# Patient Record
Sex: MALE | Race: BLACK OR AFRICAN AMERICAN | Employment: FULL TIME | URBAN - METROPOLITAN AREA
[De-identification: names, ages, dates, MRNs, and addresses within clinical notes are randomized per-mention and may not be internally consistent; named-entity substitution may affect disease eponyms.]

---

## 2017-06-13 ENCOUNTER — HOSPITAL ENCOUNTER (OUTPATIENT)
Dept: LAB | Age: 22
Discharge: HOME | End: 2017-06-13

## 2017-06-13 ENCOUNTER — OFFICE VISIT (OUTPATIENT)
Dept: FAMILY MEDICINE CLINIC | Age: 22
End: 2017-06-13

## 2017-06-13 VITALS
OXYGEN SATURATION: 99 % | RESPIRATION RATE: 16 BRPM | HEIGHT: 67 IN | HEART RATE: 77 BPM | SYSTOLIC BLOOD PRESSURE: 110 MMHG | BODY MASS INDEX: 25.27 KG/M2 | WEIGHT: 161 LBS | TEMPERATURE: 98.3 F | DIASTOLIC BLOOD PRESSURE: 70 MMHG

## 2017-06-13 DIAGNOSIS — N34.2 URETHRITIS: Primary | ICD-10-CM

## 2017-06-13 DIAGNOSIS — N34.2 URETHRITIS: ICD-10-CM

## 2017-06-13 DIAGNOSIS — L70.8 ACNEIFORM RASH: ICD-10-CM

## 2017-06-13 DIAGNOSIS — Z11.3 SCREEN FOR STD (SEXUALLY TRANSMITTED DISEASE): ICD-10-CM

## 2017-06-13 LAB — SENTARA SPECIMEN COL,SENBCF: NORMAL

## 2017-06-13 PROCEDURE — 99001 SPECIMEN HANDLING PT-LAB: CPT | Performed by: FAMILY MEDICINE

## 2017-06-13 RX ORDER — DOXYCYCLINE 100 MG/1
100 TABLET ORAL 2 TIMES DAILY
Qty: 20 TAB | Refills: 0 | Status: SHIPPED | OUTPATIENT
Start: 2017-06-13 | End: 2017-06-29 | Stop reason: SDUPTHER

## 2017-06-13 RX ORDER — AZITHROMYCIN 500 MG/1
1000 TABLET, FILM COATED ORAL DAILY
Qty: 2 TAB | Refills: 0 | Status: SHIPPED | OUTPATIENT
Start: 2017-06-13 | End: 2017-06-14

## 2017-06-13 NOTE — PATIENT INSTRUCTIONS
Safer Sex: Care Instructions  Your Care Instructions  Safer sex is a way to reduce your risk of getting an infection spread through sex. It can also help prevent pregnancy. Most infections that are spread through sex, also called sexually transmitted infections or STIs, can be cured. But some can decrease your chances of getting pregnant if they are not treated early. Others, such as herpes, have no cure. And some, such as HIV, can be deadly. Several products can help you practice safer sex and reduce your chance of STIs. One of the best is a condom. There are condoms for men and for women. The female condom is a tube of soft plastic with a closed end that is placed deep into the vagina. You can use a special rubber sheet (dental dam) for protection during oral sex. Latex gloves can keep your hands from touching blood, semen, or other body fluids that can carry infections. Remember that birth control methods such as diaphragms, IUDs, foams, and birth control pills do not stop you from getting STIs. Follow-up care is a key part of your treatment and safety. Be sure to make and go to all appointments, and call your doctor if you are having problems. Its also a good idea to know your test results and keep a list of the medicines you take. How can you care for yourself at home? · Think about getting shots to prevent hepatitis A and hepatitis B. These two diseases can be spread through sex. You also can get hepatitis A if you eat infected food. · Use condoms or female condoms each time and every time you have sex. · Learn the right way to use a male condom:  ¨ Condoms come in several sizes. Make sure you use the right size. A condom that is too small can break easily. A condom that is too big can slip off during sex. Use a new condom each time you have sex. ¨ Be careful not to poke a hole in the condom when you open the wrapper. ¨ Squeeze the tip of the condom to keep out air.   ¨ Pull down the loose skin (foreskin) from the head of an uncircumcised penis. ¨ While squeezing the tip of the condom, unroll it all the way down to the base of the firm penis. ¨ Never use petroleum jelly (such as Vaseline), grease, hand lotion, baby oil, or anything with oil in it. These products can make holes in the condom. ¨ After sex, hold the condom on your penis as you remove your penis from your partner. This will keep semen from spilling out of the condom. · Learn to use a female condom:  ¨ You can put in a female condom up to 8 hours before sex. ¨ Squeeze the smaller ring at the closed end and insert it deep into the vagina. The larger ring at the open end should stay outside the vagina. ¨ During sex, make sure the penis goes into the condom. ¨ After the penis is removed, close the open end of the condom by twisting it. Remove the condom. · Do not use a female condom and male condom at the same time. · Do not have sex with anyone who has symptoms of an STI, such as sores on the genitals or mouth. The herpes virus that causes cold sores can spread to and from the penis and vagina. · Do not drink a lot of alcohol or use drugs before sex. This can cause you to let down your guard and not practice safer sex. · Having one sex partner (who does not have STIs and does not have sex with anyone else) is a sure way to avoid STIs. · Talk to your partner before you have sex. Find out if he or she has or is at risk for any STI. Keep in mind that a person may be able to spread an STI even if he or she does not have symptoms. You and your partner may want to get an HIV test. You should get tested again 6 months later. Where can you learn more? Go to http://pro-joe.info/. Enter O472 in the search box to learn more about \"Safer Sex: Care Instructions. \"  Current as of: May 27, 2016  Content Version: 11.2  © 6135-2802 [employer].  Care instructions adapted under license by [employer] (which disclaims liability or warranty for this information). If you have questions about a medical condition or this instruction, always ask your healthcare professional. [address] any warranty or liability for your use of this information.   Follow up in 1 month for complete physical

## 2017-06-13 NOTE — PROGRESS NOTES
SUBJECTIVE  Chief Complaint   Patient presents with    Other     would like STD checking (having some mild itching in groin area)     Patient presents for STD testing. He has some concern about possible discharge. He says it has been intermittent for several weeks. Has had a new partner for 2 months about. Says that he has no other symptoms. Patient presents complaining of a rash. The patients reports the rash began about 1 month ago. The patients reports the rash is sometimes pruritic. The patient has tried a topical acne cream with very gradual improvement. The patient denies any new exposures, including, soaps, detergents, foods, etc.       OBJECTIVE    Blood pressure 110/70, pulse 77, temperature 98.3 °F (36.8 °C), temperature source Oral, resp. rate 16, height 5' 7\" (1.702 m), weight 161 lb (73 kg), SpO2 99 %. General:  Alert, cooperative, well appearing, in no apparent distress. : declines  Skin:  Forehead with a papulopustular rash spread throughout. There is a base of hyperpigmentation. ASSESSMENT / PLAN    ICD-10-CM ICD-9-CM    1. Urethritis N34.2 597.80 CHLAMYDIA / Marlise Creeks   2. Acneiform rash L30.9 692.9    3. Screen for STD (sexually transmitted disease) Z11.3 V74.5      Urethritis - will test for chlamydia / gonorrhea. I have advised on testing for HIV/syphilis but he declines. He is to call for results. If this is chlamydia, it will clear with the doxy prescribed for his rash. Acneiform rash - will see how he responds to doxycycline 100mg po bid for 10 days. He can continue his topical ance cream.  He is advised to avoid wearing anything in the area. 15 minutes of face to face time spent with the patient with at least 50% on counseling on above medical issues. All chart history elements were reviewed by me at the time of the visit even though marked at time of note closure. Patient understands our medical plan.  Patient has provided input and agrees with goals. Alternatives have been explained and offered. All questions answered. The patient is to call if condition worsens or fails to improve.      Follow-up Disposition:  Return in about 1 month (around 7/13/2017) for complete physical.

## 2017-06-13 NOTE — PROGRESS NOTES
1. Have you been to the ER, urgent care clinic since your last visit? Hospitalized since your last visit? No    2. Have you seen or consulted any other health care providers outside of the [address] [place] [address] since your last visit? Include any pap smears or colon screening.  No

## 2017-06-13 NOTE — MR AVS SNAPSHOT
Visit Information Date & Time Provider Department Dept. Phone Encounter #  
 6/13/2017  3:30 PM Celeste Aguirre, 0 [facility] [phone] [id number] Follow-up Instructions Return in about 1 month (around 7/13/2017) for complete physical.  
  
Upcoming Health Maintenance Date Due  
 HPV AGE 9Y-34Y (1 of 3 - Male 3 Dose Series) 11/5/2006 DTaP/Tdap/Td series (7 - Td) 5/4/2017 INFLUENZA AGE 9 TO ADULT 8/1/2017 Allergies as of 6/13/2017  Review Complete On: 6/23/2016 By: Celeste Aguirre MD  
 No Known Allergies Current Immunizations  Reviewed on 1/8/2015 Name Date DTAP Vaccine 12/13/1999, 11/7/1996, 4/26/1996, 3/4/1996, 1/5/1996 HIB Vaccine 11/7/1996, 4/26/1996, 3/4/1996, 1/5/1996 Hepatitis A Vaccine 11/13/2007, 5/4/2007 Hepatitis B Vaccine 4/26/1996, 1/5/1996, 1995 IPV 12/13/1999, 4/26/1996, 3/4/1996, 1/5/1996 MMR Vaccine 12/13/1999, 11/7/1997 Meningococcal (MCV4P) Vaccine 8/5/2013 Meningococcal Vaccine 5/4/2007 PPD 12/13/1999, 3/30/1998 TDAP Vaccine 5/4/2007 Varicella Virus Vaccine Live 11/3/2010, 5/13/1997 Not reviewed this visit You Were Diagnosed With   
  
 Codes Comments Urethritis    -  Primary ICD-10-CM: N34.2 ICD-9-CM: 597.80 Acneiform rash     ICD-10-CM: L30.9 ICD-9-CM: 692.9 Screen for STD (sexually transmitted disease)     ICD-10-CM: Z11.3 ICD-9-CM: V74.5 Vitals BP Pulse Temp Resp Height(growth percentile) Weight(growth percentile) 110/70 (BP 1 Location: Left arm, BP Patient Position: Sitting) 77 98.3 °F (36.8 °C) (Oral) 16 5' 7\" (1.702 m) 161 lb (73 kg) SpO2 BMI Smoking Status 99% 25.22 kg/m2 Never Smoker Vitals History BMI and BSA Data Body Mass Index Body Surface Area  
 25.22 kg/m 2 1.86 m 2 Preferred Pharmacy Pharmacy Name Phone [facility] [address] [phone] Your Updated Medication List  
  
   
This list is accurate as of: 6/13/17  4:05 PM.  Always use your most recent med list.  
  
  
  
  
 azithromycin 500 mg Tab Commonly known as:  Himarina Palma Take 2 Tabs by mouth daily for 1 day. doxycycline 100 mg tablet Commonly known as:  ADOXA Take 1 Tab by mouth two (2) times a day. fluticasone 50 mcg/actuation nasal spray Commonly known as:  Mena Rosanne 2 Sprays by Both Nostrils route daily. loratadine 10 mg tablet Commonly known as:  Jarrett Adan Take 1 Tab by mouth daily. triamcinolone acetonide 0.1 % topical cream  
Commonly known as:  KENALOG Apply  to affected area two (2) times a day. use thin layer Prescriptions Sent to Pharmacy Refills  
 azithromycin (ZITHROMAX) 500 mg tab 0 Sig: Take 2 Tabs by mouth daily for 1 day. Class: Normal  
 Pharmacy: [facility] [address] [place] [address] Ph #: [phone] Route: Oral  
 doxycycline (ADOXA) 100 mg tablet 0 Sig: Take 1 Tab by mouth two (2) times a day. Class: Normal  
 Pharmacy: [facility] [address] [place] [address] Ph #: [phone] Route: Oral  
  
Follow-up Instructions Return in about 1 month (around 7/13/2017) for complete physical.  
  
To-Do List   
 06/13/2017 Lab:  David Monique / Sade Monroy Patient Instructions Safer Sex: Care Instructions Your Care Instructions Safer sex is a way to reduce your risk of getting an infection spread through sex. It can also help prevent pregnancy. Most infections that are spread through sex, also called sexually transmitted infections or STIs, can be cured. But some can decrease your chances of getting pregnant if they are not treated early. Others, such as herpes, have no cure. And some, such as HIV, can be deadly.  
Several products can help you practice safer sex and reduce your chance of STIs. One of the best is a condom. There are condoms for men and for women. The female condom is a tube of soft plastic with a closed end that is placed deep into the vagina. You can use a special rubber sheet (dental dam) for protection during oral sex. Latex gloves can keep your hands from touching blood, semen, or other body fluids that can carry infections. Remember that birth control methods such as diaphragms, IUDs, foams, and birth control pills do not stop you from getting STIs. Follow-up care is a key part of your treatment and safety. Be sure to make and go to all appointments, and call your doctor if you are having problems. Its also a good idea to know your test results and keep a list of the medicines you take. How can you care for yourself at home? · Think about getting shots to prevent hepatitis A and hepatitis B. These two diseases can be spread through sex. You also can get hepatitis A if you eat infected food. · Use condoms or female condoms each time and every time you have sex. · Learn the right way to use a male condom: ¨ Condoms come in several sizes. Make sure you use the right size. A condom that is too small can break easily. A condom that is too big can slip off during sex. Use a new condom each time you have sex. ¨ Be careful not to poke a hole in the condom when you open the wrapper. ¨ Squeeze the tip of the condom to keep out air. ¨ Pull down the loose skin (foreskin) from the head of an uncircumcised penis. ¨ While squeezing the tip of the condom, unroll it all the way down to the base of the firm penis. ¨ Never use petroleum jelly (such as Vaseline), grease, hand lotion, baby oil, or anything with oil in it. These products can make holes in the condom. ¨ After sex, hold the condom on your penis as you remove your penis from your partner. This will keep semen from spilling out of the condom. · Learn to use a female condom: ¨ You can put in a female condom up to 8 hours before sex. ¨ Squeeze the smaller ring at the closed end and insert it deep into the vagina. The larger ring at the open end should stay outside the vagina. ¨ During sex, make sure the penis goes into the condom. ¨ After the penis is removed, close the open end of the condom by twisting it. Remove the condom. · Do not use a female condom and male condom at the same time. · Do not have sex with anyone who has symptoms of an STI, such as sores on the genitals or mouth. The herpes virus that causes cold sores can spread to and from the penis and vagina. · Do not drink a lot of alcohol or use drugs before sex. This can cause you to let down your guard and not practice safer sex. · Having one sex partner (who does not have STIs and does not have sex with anyone else) is a sure way to avoid STIs. · Talk to your partner before you have sex. Find out if he or she has or is at risk for any STI. Keep in mind that a person may be able to spread an STI even if he or she does not have symptoms. You and your partner may want to get an HIV test. You should get tested again 6 months later. Where can you learn more? Go to http://pro-joe.info/. Enter E522 in the search box to learn more about \"Safer Sex: Care Instructions. \" Current as of: May 27, 2016 Content Version: 11.2 © 4703-9233 [email or web address]. Care instructions adapted under license by [employer] (which disclaims liability or warranty for this information). If you have questions about a medical condition or this instruction, always ask your healthcare professional. [address] any warranty or liability for your use of this information. Follow up in 1 month for complete physical 
 
  
Introducing hospitals & HEALTH SERVICES!    
 [employer] introduces [employer] patient portal. Now you can access parts of your medical record, email your doctor's office, and request medication refills online. 1. In your internet browser, go to [email or web address] 2. Click on the First Time User? Click Here link in the Sign In box. You will see the New Member Sign Up page. 3. Enter your [employer] Access Code exactly as it appears below. You will not need to use this code after youve completed the sign-up process. If you do not sign up before the expiration date, you must request a new code. · Cell[employer] Access Code: [facility] Expires: 9/11/2017  4:05 PM 
 
4. Enter the last four digits of your Social Security Number (xxxx) and Date of Birth (mm/dd/yyyy) as indicated and click Submit. You will be taken to the next sign-up page. 5. Create a [employer] ID. This will be your [employer] login ID and cannot be changed, so think of one that is secure and easy to remember. 6. Create a [employer] password. You can change your password at any time. 7. Enter your Password Reset Question and Answer. This can be used at a later time if you forget your password. 8. Enter your e-mail address. You will receive e-mail notification when new information is available in [address] 9. Click Sign Up. You can now view and download portions of your medical record. 10. Click the Download Summary menu link to download a portable copy of your medical information. If you have questions, please visit the Frequently Asked Questions section of the [employer] website. Remember, [employer] is NOT to be used for urgent needs. For medical emergencies, dial 911. Now available from your iPhone and Android! Please provide this summary of care documentation to your next provider. Your primary care clinician is listed as Robin Plasencia. If you have any questions after today's visit, please call [phone].

## 2017-06-14 LAB
CHLAMYDIA AMPLIFIED URINE: NEGATIVE
GC AMPLIFIED URINE,919: NEGATIVE

## 2017-06-15 NOTE — PROGRESS NOTES
Nurse to advise that the testing was negative. I would still continue the medication (antibiotic) we prescribed to help his forehead rash.

## 2017-06-15 NOTE — PROGRESS NOTES
Mobile place call to [address] [place] [address] and last 4 digits of SS# verified. Per Dr. Jeff Copjohnathan labs were normal, but to continue with antibiotic until all gone.  Melvin Navas voice complete understanding

## 2017-06-23 NOTE — TELEPHONE ENCOUNTER
Patient states he still has a rash on his forehead. This patient contacted office for the following prescriptions to be filled:    Medication requested :   Requested Prescriptions     Pending Prescriptions Disp Refills    doxycycline (ADOXA) 100 mg tablet 20 Tab 0     Sig: Take 1 Tab by mouth two (2) times a day.      PCP: Miguelito Collier or Print: [address]  Mail order or Local pharmacy: [phone]    Scheduled appointment if not seen by current providers in office: LOV: 6/13/17, No followup

## 2017-06-26 RX ORDER — DOXYCYCLINE 100 MG/1
100 TABLET ORAL 2 TIMES DAILY
Qty: 20 TAB | Refills: 0 | OUTPATIENT
Start: 2017-06-26

## 2017-06-29 ENCOUNTER — OFFICE VISIT (OUTPATIENT)
Dept: FAMILY MEDICINE CLINIC | Age: 22
End: 2017-06-29

## 2017-06-29 VITALS
RESPIRATION RATE: 16 BRPM | BODY MASS INDEX: 25.27 KG/M2 | WEIGHT: 161 LBS | HEIGHT: 67 IN | DIASTOLIC BLOOD PRESSURE: 70 MMHG | SYSTOLIC BLOOD PRESSURE: 114 MMHG | HEART RATE: 71 BPM | OXYGEN SATURATION: 98 % | TEMPERATURE: 98.3 F

## 2017-06-29 DIAGNOSIS — L70.9 ACNE, UNSPECIFIED ACNE TYPE: Primary | ICD-10-CM

## 2017-06-29 RX ORDER — DOXYCYCLINE 100 MG/1
100 TABLET ORAL DAILY
Qty: 30 TAB | Refills: 0 | Status: SHIPPED | OUTPATIENT
Start: 2017-06-29 | End: 2019-12-23

## 2017-06-29 NOTE — PROGRESS NOTES
1. Have you been to the ER, urgent care clinic since your last visit? Hospitalized since your last visit? No    2. Have you seen or consulted any other health care providers outside of the [address] [place] [address] since your last visit? Include any pap smears or colon screening.  No

## 2017-06-29 NOTE — PROGRESS NOTES
SUBJECTIVE  Chief Complaint   Patient presents with    Medication Evaluation    Rash     Patient presents complaining of a persistent acneiform rash. He says he has been using OTC differin cream with some relief for a few weeks. He says that he noticed more relief when he was on antibiotics. He no longer complains of any  symptoms. OBJECTIVE    Blood pressure 114/70, pulse 71, temperature 98.3 °F (36.8 °C), temperature source Oral, resp. rate 16, height 5' 7\" (1.702 m), weight 161 lb (73 kg), SpO2 98 %. General:  alert, cooperative, well appearing, in no apparent distress. Skin:  He has hyperpigmentation on his forehead. He has an acneiform rash on his forehead with some scattered whiteheads. Some are papular. ASSESSMENT / PLAN    ICD-10-CM ICD-9-CM    1. Acne, unspecified acne type L70.9 706.1      We will start him on doxycycline 100mg daily. He will be careful with sun exposure. He will continue use of differin at night. He will start a daily benzoyl peroxide wash in the mornings. I advised that this can be drying. I advised that the hyperpigmentation is a typical post-inflammatory change that will take time to resolve. 10 minutes of face to face time spent with the patient with at least 50% on counseling on above medical issues. All chart history elements were reviewed by me at the time of the visit even though marked at time of note closure. Patient understands our medical plan. Patient has provided input and agrees with goals. Alternatives have been explained and offered. All questions answered. The patient is to call if condition worsens or fails to improve. Follow-up Disposition:  Return as needed.

## 2017-06-29 NOTE — MR AVS SNAPSHOT
Visit Information Date & Time Provider Department Dept. Phone Encounter #  
 6/29/2017 11:00 AM Nanette Myers, [address] [id number] Follow-up Instructions Return as needed. Upcoming Health Maintenance Date Due  
 HPV AGE 9Y-34Y (1 of 3 - Male 3 Dose Series) 11/5/2006 DTaP/Tdap/Td series (7 - Td) 5/4/2017 INFLUENZA AGE 9 TO ADULT 8/1/2017 Allergies as of 6/29/2017  Review Complete On: 6/13/2017 By: Nanette Myers MD  
 No Known Allergies Current Immunizations  Reviewed on 1/8/2015 Name Date DTAP Vaccine 12/13/1999, 11/7/1996, 4/26/1996, 3/4/1996, 1/5/1996 HIB Vaccine 11/7/1996, 4/26/1996, 3/4/1996, 1/5/1996 Hepatitis A Vaccine 11/13/2007, 5/4/2007 Hepatitis B Vaccine 4/26/1996, 1/5/1996, 1995 IPV 12/13/1999, 4/26/1996, 3/4/1996, 1/5/1996 MMR Vaccine 12/13/1999, 11/7/1997 Meningococcal (MCV4P) Vaccine 8/5/2013 Meningococcal Vaccine 5/4/2007 PPD 12/13/1999, 3/30/1998 TDAP Vaccine 5/4/2007 Varicella Virus Vaccine Live 11/3/2010, 5/13/1997 Not reviewed this visit You Were Diagnosed With   
  
 Codes Comments Acne, unspecified acne type    -  Primary ICD-10-CM: L70.9 ICD-9-CM: 706.1 Vitals BP Pulse Temp Resp Height(growth percentile) Weight(growth percentile) 114/70 (BP 1 Location: Left arm, BP Patient Position: Sitting) 71 98.3 °F (36.8 °C) (Oral) 16 5' 7\" (1.702 m) 161 lb (73 kg) SpO2 BMI Smoking Status 98% 25.22 kg/m2 Never Smoker Vitals History BMI and BSA Data Body Mass Index Body Surface Area  
 25.22 kg/m 2 1.86 m 2 Preferred Pharmacy Pharmacy Name Phone [phone] [facility] [place], [address] AT [address] [phone] Your Updated Medication List  
  
   
This list is accurate as of: 6/29/17 11:14 AM.  Always use your most recent med list.  
  
  
  
  
 doxycycline 100 mg tablet Commonly known as:  ADOXA Take 1 Tab by mouth daily. fluticasone 50 mcg/actuation nasal spray Commonly known as:  Algerhung Sonny 2 Sprays by Both Nostrils route daily. loratadine 10 mg tablet Commonly known as:  Manual Me Take 1 Tab by mouth daily. triamcinolone acetonide 0.1 % topical cream  
Commonly known as:  KENALOG Apply  to affected area two (2) times a day. use thin layer Prescriptions Sent to Pharmacy Refills  
 doxycycline (ADOXA) 100 mg tablet 0 Sig: Take 1 Tab by mouth daily. Class: Normal  
 Pharmacy: [facility] [address] [place] [address] AT [address] Ph #: [phone] Route: Oral  
  
Follow-up Instructions Return as needed. Patient Instructions Benzoyl Peroxide (On the skin) Benzoyl Peroxide (SANTHOSH-andreea-il per-OX-nelly) Treats acne and other skin conditions. Brand Name(s): Acne Foaming Face Wash, Acne Medication 10, Acne Medication 5, Acne-10, Acne-5, BPO 4% Creamy Wash Complete Pack, BPO 4% Gel, BPO 8% Creamy Wash Complete Pack, BPO 8% Gel, BPO Foaming Cloths, BPO-10, BPO-5, BenzEFoam, BenzEFoam Ultra, Benzac AC There may be other brand names for this medicine. When This Medicine Should Not Be Used: This medicine is not right for everyone. Do not use it if you had an allergic reaction to benzoyl peroxide. How to Use This Medicine:  
Bar, Cream, Foam, Gel/Jelly, Liquid, Lotion, Pad, Soap · When you first begin to use this product, apply it to 1 or 2 small affected areas of the skin for 3 days. If no discomfort occurs, follow the directions on the product label or use the medicine as directed by your doctor. · Use this medicine only on your skin. Rinse it off right away if it gets on a cut or scrape. Do not get the medicine in your eyes, nose, or mouth. · Missed dose:Apply a dose as soon as you can.  If it is almost time for your next dose, wait until then and apply a regular dose. Do not apply extra medicine to make up for a missed dose. · Store the medicine in a closed container at room temperature, away from heat, moisture, and direct light. Do not freeze. Drugs and Foods to Avoid: Ask your doctor or pharmacist before using any other medicine, including over-the-counter medicines, vitamins, and herbal products. Warnings While Using This Medicine: · Tell your doctor if you are pregnant or breastfeeding, or if you ever had an allergic reaction to an acne product. · This medicine may bleach your hair or clothes. · This medicine may make your skin more sensitive to sunlight. Wear sunscreen. Do not use sunlamps or tanning beds. · Call your doctor if your symptoms do not improve or if they get worse. · Keep all medicine out of the reach of children. Never share your medicine with anyone. Possible Side Effects While Using This Medicine:  
Call your doctor right away if you notice any of these side effects: · Allergic reaction: Itching or hives, swelling in your face or hands, swelling or tingling in your mouth or throat, chest tightness, trouble breathing · Burning, blistering, swollen, or peeling skin · Fainting · Swelling of the eyes, face, lips, or tongue If you notice these less serious side effects, talk with your doctor: · Mild stinging, itching, redness, or dry skin If you notice other side effects that you think are caused by this medicine, tell your doctor. Call your doctor for medical advice about side effects. You may report side effects to FDA at 3-735-FDA-8161 © 2017 [facility] Information is for End User's use only and may not be sold, redistributed or otherwise used for commercial purposes. The above information is an  only. It is not intended as medical advice for individual conditions or treatments.  Talk to your doctor, nurse or pharmacist before following any medical regimen to see if it is safe and effective for you. Introducing [facility]! Tom Gautam introduces SnoopWall patient portal. Now you can access parts of your medical record, email your doctor's office, and request medication refills online. 1. In your internet browser, go to [email or web address] 2. Click on the First Time User? Click Here link in the Sign In box. You will see the New Member Sign Up page. 3. Enter your SnoopWall Access Code exactly as it appears below. You will not need to use this code after youve completed the sign-up process. If you do not sign up before the expiration date, you must request a new code. · SnoopWall Access Code: [facility] Expires: 9/11/2017  4:05 PM 
 
4. Enter the last four digits of your Social Security Number (xxxx) and Date of Birth (mm/dd/yyyy) as indicated and click Submit. You will be taken to the next sign-up page. 5. Create a SnoopWall ID. This will be your SnoopWall login ID and cannot be changed, so think of one that is secure and easy to remember. 6. Create a SnoopWall password. You can change your password at any time. 7. Enter your Password Reset Question and Answer. This can be used at a later time if you forget your password. 8. Enter your e-mail address. You will receive e-mail notification when new information is available in [address] 9. Click Sign Up. You can now view and download portions of your medical record. 10. Click the Download Summary menu link to download a portable copy of your medical information. If you have questions, please visit the Frequently Asked Questions section of the SnoopWall website. Remember, SnoopWall is NOT to be used for urgent needs. For medical emergencies, dial 911. Now available from your iPhone and Android! Please provide this summary of care documentation to your next provider. Your primary care clinician is listed as Chepe Bone. If you have any questions after today's visit, please call [phone].

## 2017-06-29 NOTE — PATIENT INSTRUCTIONS
Benzoyl Peroxide (On the skin)   Benzoyl Peroxide (SANTHOSH-andreea-il per-OX-nelly)  Treats acne and other skin conditions. Brand Name(s): Acne Foaming Face Wash, Acne Medication 10, Acne Medication 5, Acne-10, Acne-5, BPO 4% Creamy Wash Complete Pack, BPO 4% Gel, BPO 8% Creamy Wash Complete Pack, BPO 8% Gel, BPO Foaming Cloths, BPO-10, BPO-5, BenzEFoam, BenzEFoam Ultra, Benzac AC   There may be other brand names for this medicine. When This Medicine Should Not Be Used: This medicine is not right for everyone. Do not use it if you had an allergic reaction to benzoyl peroxide. How to Use This Medicine:   Bar, Cream, Foam, Gel/Jelly, Liquid, Lotion, Pad, Soap  · When you first begin to use this product, apply it to 1 or 2 small affected areas of the skin for 3 days. If no discomfort occurs, follow the directions on the product label or use the medicine as directed by your doctor. · Use this medicine only on your skin. Rinse it off right away if it gets on a cut or scrape. Do not get the medicine in your eyes, nose, or mouth. · Missed dose:Apply a dose as soon as you can. If it is almost time for your next dose, wait until then and apply a regular dose. Do not apply extra medicine to make up for a missed dose. · Store the medicine in a closed container at room temperature, away from heat, moisture, and direct light. Do not freeze. Drugs and Foods to Avoid:      Ask your doctor or pharmacist before using any other medicine, including over-the-counter medicines, vitamins, and herbal products. Warnings While Using This Medicine:   · Tell your doctor if you are pregnant or breastfeeding, or if you ever had an allergic reaction to an acne product. · This medicine may bleach your hair or clothes. · This medicine may make your skin more sensitive to sunlight. Wear sunscreen. Do not use sunlamps or tanning beds. · Call your doctor if your symptoms do not improve or if they get worse.   · Keep all medicine out of the reach of children. Never share your medicine with anyone. Possible Side Effects While Using This Medicine:   Call your doctor right away if you notice any of these side effects:  · Allergic reaction: Itching or hives, swelling in your face or hands, swelling or tingling in your mouth or throat, chest tightness, trouble breathing  · Burning, blistering, swollen, or peeling skin  · Fainting  · Swelling of the eyes, face, lips, or tongue  If you notice these less serious side effects, talk with your doctor:   · Mild stinging, itching, redness, or dry skin  If you notice other side effects that you think are caused by this medicine, tell your doctor. Call your doctor for medical advice about side effects. You may report side effects to FDA at 6-886-VHO-2553  © 2017 [facility] Information is for End User's use only and may not be sold, redistributed or otherwise used for commercial purposes. The above information is an  only. It is not intended as medical advice for individual conditions or treatments. Talk to your doctor, nurse or pharmacist before following any medical regimen to see if it is safe and effective for you.

## 2018-12-07 ENCOUNTER — TELEPHONE (OUTPATIENT)
Dept: FAMILY MEDICINE CLINIC | Age: 23
End: 2018-12-07

## 2018-12-07 NOTE — TELEPHONE ENCOUNTER
Pt would like to get lab orders for STD testing. Please advise. Pt is aware that DR Anastacio Guerra is not in the office and this will be addressed on Monday.

## 2018-12-10 NOTE — TELEPHONE ENCOUNTER
Apt made with Trae Rodriguez because she needs a note before Wednesday  Dr Alyson Davis has no apts  Left message for [facility] Do[facility] to call FP due for complete physical

## 2019-12-23 ENCOUNTER — OFFICE VISIT (OUTPATIENT)
Dept: FAMILY MEDICINE CLINIC | Age: 24
End: 2019-12-23

## 2019-12-23 VITALS
WEIGHT: 167 LBS | OXYGEN SATURATION: 97 % | HEART RATE: 68 BPM | DIASTOLIC BLOOD PRESSURE: 76 MMHG | SYSTOLIC BLOOD PRESSURE: 114 MMHG | BODY MASS INDEX: 26.21 KG/M2 | TEMPERATURE: 97.9 F | HEIGHT: 67 IN | RESPIRATION RATE: 14 BRPM

## 2019-12-23 DIAGNOSIS — Z83.3 FAMILY HISTORY OF DIABETES MELLITUS: ICD-10-CM

## 2019-12-23 DIAGNOSIS — Z11.3 SCREEN FOR STD (SEXUALLY TRANSMITTED DISEASE): ICD-10-CM

## 2019-12-23 DIAGNOSIS — Z13.220 SCREENING CHOLESTEROL LEVEL: ICD-10-CM

## 2019-12-23 DIAGNOSIS — Z13.1 SCREENING FOR DIABETES MELLITUS: ICD-10-CM

## 2019-12-23 DIAGNOSIS — Z00.00 WELL ADULT EXAM: Primary | ICD-10-CM

## 2019-12-23 DIAGNOSIS — E66.3 OVERWEIGHT WITH BODY MASS INDEX (BMI) 25.0-29.9: ICD-10-CM

## 2019-12-23 NOTE — PROGRESS NOTES
1. Have you been to the ER, urgent care clinic since your last visit? Hospitalized since your last visit? No    2. Have you seen or consulted any other health care providers outside of the [address] [place] [address] since your last visit? Include any pap smears or colon screening.  No

## 2019-12-23 NOTE — PROGRESS NOTES
Chief Complaint   Patient presents with    Physical     STD screening     Other     lab panel      Subjective:   Alma Cardenas is a 25 y.o. male presenting for his annual checkup and college physical.    ROS:  Feeling well. No dyspnea or chest pain on exertion. No abdominal pain, change in bowel habits, black or bloody stools. No urinary tract or prostatic symptoms. No neurological complaints. Specific concerns today: none. No current outpatient medications on file. No Known Allergies  Past Medical History:   Diagnosis Date    Allergic rhinitis     Chlamydia infection 5/2013    [facility]   [facility]    Tension headache      History reviewed. No pertinent surgical history. Family History   Problem Relation Age of Onset    Allergic Rhinitis Mother     Diabetes Paternal Grandfather     Kidney Disease Paternal Grandfather      Social History     Tobacco Use    Smoking status: Never Smoker    Smokeless tobacco: Never Used   Substance Use Topics    Alcohol use: Yes     Frequency: 2-4 times a month     Drinks per session: 1 or 2      Objective:     Visit Vitals  /76 (BP 1 Location: Left arm, BP Patient Position: Sitting)   Pulse 68   Temp 97.9 °F (36.6 °C) (Oral)   Resp 14   Ht 5' 7\" (1.702 m)   Wt 167 lb (75.8 kg)   SpO2 97%   BMI 26.16 kg/m²     The patient appears well, alert, oriented x 3, in no distress. ENT normal.  Neck supple. No adenopathy or thyromegaly. MAJOR. Lungs are clear, good air entry, no wheezes, rhonchi or rales. S1 and S2 normal, no murmurs, regular rate and rhythm. Abdomen is soft without tenderness, guarding, mass or organomegaly. Extremities show no edema, normal peripheral pulses. Neurological is normal without focal findings. Psych: normal affect. Mood good. Oriented x 3. Judgement and insight intact. Assessment/Plan:       ICD-10-CM ICD-9-CM    1. Well adult exam Z00.00 V70.0    2.  Screening cholesterol level Z13.220 V77.91 LIPID PANEL   3. Screening for diabetes mellitus Z13.1 V77.1 GLUCOSE, RANDOM   4. Overweight with body mass index (BMI) 25.0-29.9 E66.3 278.02 GLUCOSE, RANDOM   5. Family history of diabetes mellitus Z83.3 V18.0 GLUCOSE, RANDOM   6. Screen for STD (sexually transmitted disease) Z11.3 V74.5 HIV 1/2 AG/AB, 4TH GENERATION,W RFLX CONFIRM      T PALLIDUM AB      CHLAMYDIA/NEISSERIA [de-identified]     Age and sex specific counseling. Patient ed handouts. Self testicular exam handouts. Fasting screening labs ordered. Work on diet and exercise for excess BMI. Vaccines are up to date. Says he had Tdap and flu vaccines at college. All chart history elements were reviewed by me at the time of the visit even though marked at time of note closure. Patient understands our medical plan. Patient has provided input and agrees with goals. Alternatives have been explained and offered. All questions answered. The patient is to call if condition worsens or fails to improve.      Follow-up and Dispositions    · Return in about 1 year (around 12/23/2020) for physical. Return for TB test .

## 2019-12-23 NOTE — PATIENT INSTRUCTIONS
A Healthy Lifestyle: Care Instructions Your Care Instructions A healthy lifestyle can help you feel good, stay at a healthy weight, and have plenty of energy for both work and play. A healthy lifestyle is something you can share with your whole family. A healthy lifestyle also can lower your risk for serious health problems, such as high blood pressure, heart disease, and diabetes. You can follow a few steps listed below to improve your health and the health of your family. Follow-up care is a key part of your treatment and safety. Be sure to make and go to all appointments, and call your doctor if you are having problems. It's also a good idea to know your test results and keep a list of the medicines you take. How can you care for yourself at home? · Do not eat too much sugar, fat, or fast foods. You can still have dessert and treats now and then. The goal is moderation. · Start small to improve your eating habits. Pay attention to portion sizes, drink less juice and soda pop, and eat more fruits and vegetables. ? Eat a healthy amount of food. A 3-ounce serving of meat, for example, is about the size of a deck of cards. Fill the rest of your plate with vegetables and whole grains. ? Limit the amount of soda and sports drinks you have every day. Drink more water when you are thirsty. ? Eat at least 5 servings of fruits and vegetables every day. It may seem like a lot, but it is not hard to reach this goal. A serving or helping is 1 piece of fruit, 1 cup of vegetables, or 2 cups of leafy, raw vegetables. Have an apple or some carrot sticks as an afternoon snack instead of a candy bar. Try to have fruits and/or vegetables at every meal. 
· Make exercise part of your daily routine. You may want to start with simple activities, such as walking, bicycling, or slow swimming. Try to be active 30 to 60 minutes every day.  You do not need to do all 30 to 60 minutes all at once. For example, you can exercise 3 times a day for 10 or 20 minutes. Moderate exercise is safe for most people, but it is always a good idea to talk to your doctor before starting an exercise program. 
· Keep moving. Mary Reddy the lawn, work in the garden, or [place]. Take the stairs instead of the elevator at work. · If you smoke, quit. People who smoke have an increased risk for heart attack, stroke, cancer, and other lung illnesses. Quitting is hard, but there are ways to boost your chance of quitting tobacco for good. ? Use nicotine gum, patches, or lozenges. ? Ask your doctor about stop-smoking programs and medicines. ? Keep trying. In addition to reducing your risk of diseases in the future, you will notice some benefits soon after you stop using tobacco. If you have shortness of breath or asthma symptoms, they will likely get better within a few weeks after you quit. · Limit how much alcohol you drink. Moderate amounts of alcohol (up to 2 drinks a day for men, 1 drink a day for women) are okay. But drinking too much can lead to liver problems, high blood pressure, and other health problems. Family health If you have a family, there are many things you can do together to improve your health. · Eat meals together as a family as often as possible. · Eat healthy foods. This includes fruits, vegetables, lean meats and dairy, and whole grains. · Include your family in your fitness plan. Most people think of activities such as jogging or tennis as the way to fitness, but there are many ways you and your family can be more active. Anything that makes you breathe hard and gets your heart pumping is exercise. Here are some tips: 
? Walk to do errands or to take your child to school or the bus. 
? Go for a family bike ride after dinner instead of watching TV. Where can you learn more? Go to http://pro-joe.info/. Enter Y642 in the search box to learn more about \"A Healthy Lifestyle: Care Instructions. \" Current as of: May 28, 2019 Content Version: 12.2 © 6955-7894 [employer]. Care instructions adapted under license by [employer] (which disclaims liability or warranty for this information). If you have questions about a medical condition or this instruction, always ask your healthcare professional. [address] any warranty or liability for your use of this information. Well Visit, Ages 25 to 48: Care Instructions Your Care Instructions Physical exams can help you stay healthy. Your doctor has checked your overall health and may have suggested ways to take good care of yourself. He or she also may have recommended tests. At home, you can help prevent illness with healthy eating, regular exercise, and other steps. Follow-up care is a key part of your treatment and safety. Be sure to make and go to all appointments, and call your doctor if you are having problems. It's also a good idea to know your test results and keep a list of the medicines you take. How can you care for yourself at home? · Reach and stay at a healthy weight. This will lower your risk for many problems, such as obesity, diabetes, heart disease, and high blood pressure. · Get at least 30 minutes of physical activity on most days of the week. Walking is a good choice. You also may want to do other activities, such as running, swimming, cycling, or playing tennis or team sports. Discuss any changes in your exercise program with your doctor. · Do not smoke or allow others to smoke around you. If you need help quitting, talk to your doctor about stop-smoking programs and medicines. These can increase your chances of quitting for good. · Talk to your doctor about whether you have any risk factors for sexually transmitted infections (STIs).  Having one sex partner (who does not have STIs and does not have sex with anyone else) is a good way to avoid these infections. · Use birth control if you do not want to have children at this time. Talk with your doctor about the choices available and what might be best for you. · Protect your skin from too much sun. When you're outdoors from 10 a.m. to 4 p.m., stay in the shade or cover up with clothing and a hat with a wide brim. Wear sunglasses that block UV rays. Even when it's cloudy, put broad-spectrum sunscreen (SPF 30 or higher) on any exposed skin. · See a dentist one or two times a year for checkups and to have your teeth cleaned. · Wear a seat belt in the car. Follow your doctor's advice about when to have certain tests. These tests can spot problems early. For everyone · Cholesterol. Have the fat (cholesterol) in your blood tested after age 21. Your doctor will tell you how often to have this done based on your age, family history, or other things that can increase your risk for heart disease. · Blood pressure. Have your blood pressure checked during a routine doctor visit. Your doctor will tell you how often to check your blood pressure based on your age, your blood pressure results, and other factors. · Vision. Talk with your doctor about how often to have a glaucoma test. 
· Diabetes. Ask your doctor whether you should have tests for diabetes. · Colon cancer. Your risk for colorectal cancer gets higher as you get older. Some experts say that adults should start regular screening at age 48 and stop at age 76. Others say to start before age 48 or continue after age 76. Talk with your doctor about your risk and when to start and stop screening. For women · Breast exam and mammogram. Talk to your doctor about when you should have a clinical breast exam and a mammogram. Medical experts differ on whether and how often women under 50 should have these tests. Your doctor can help you decide what is right for you. · Cervical cancer screening test and pelvic exam. Begin with a Pap test at age 24. The test often is part of a pelvic exam. Starting at age 27, you may choose to have a Pap test, an HPV test, or both tests at the same time (called co-testing). Talk with your doctor about how often to have testing. · Tests for sexually transmitted infections (STIs). Ask whether you should have tests for STIs. You may be at risk if you have sex with more than one person, especially if your partners do not wear condoms. For men · Tests for sexually transmitted infections (STIs). Ask whether you should have tests for STIs. You may be at risk if you have sex with more than one person, especially if you do not wear a condom. · Testicular cancer exam. Ask your doctor whether you should check your testicles regularly. · Prostate exam. Talk to your doctor about whether you should have a blood test (called a PSA test) for prostate cancer. Experts differ on whether and when men should have this test. Some experts suggest it if you are older than 39 and are -American or have a father or brother who got prostate cancer when he was younger than 72. When should you call for help? Watch closely for changes in your health, and be sure to contact your doctor if you have any problems or symptoms that concern you. Where can you learn more? Go to http://pro-joe.info/. Enter P072 in the search box to learn more about \"Well Visit, Ages 25 to 48: Care Instructions. \" Current as of: December 13, 2018 Content Version: 12.2 © 9807-8174 [employer]. Care instructions adapted under license by [employer] (which disclaims liability or warranty for this information). If you have questions about a medical condition or this instruction, always ask your healthcare professional. [place] [address] any warranty or liability for your use of this information.

## 2020-01-22 ENCOUNTER — HOSPITAL ENCOUNTER (OUTPATIENT)
Dept: LAB | Age: 25
Discharge: HOME | End: 2020-01-22

## 2020-01-22 PROCEDURE — 99001 SPECIMEN HANDLING PT-LAB: CPT

## 2020-01-23 LAB
HIV -1/0/2 AG/AB WITH REFLEX, 13463: NON REACTIVE
HIV 1 & 2 AB SER-IMP: NORMAL

## 2020-01-28 ENCOUNTER — TELEPHONE (OUTPATIENT)
Dept: FAMILY MEDICINE CLINIC | Age: 25
End: 2020-01-28

## 2020-01-28 LAB
CHLAMYDIA AMPLIFIED URINE: NEGATIVE
CHOLEST SERPL-MCNC: 175 MG/DL (ref 110–200)
GC AMPLIFIED URINE,919: NEGATIVE
GLUCOSE SERPL-MCNC: 95 MG/DL (ref 70–99)
HDLC SERPL-MCNC: 4.4 MG/DL (ref 0–5)
HDLC SERPL-MCNC: 40 MG/DL
HIV -1/0/2 AG/AB WITH REFLEX, 13463: NON REACTIVE
HIV 1 & 2 AB SER-IMP: NORMAL
LDL/HDL RATIO,LDHD: 2.3
LDLC SERPL CALC-MCNC: 92 MG/DL (ref 50–99)
NON-HDL CHOLESTEROL, 011976: 135 MG/DL
SENTARA SPECIMEN COL,SENBCF: NORMAL
TREPONEMA PALLIDUM AB: NON REACTIVE
TRIGL SERPL-MCNC: 218 MG/DL (ref 40–149)
VLDLC SERPL CALC-MCNC: 44 MG/DL (ref 8–30)

## 2020-03-05 ENCOUNTER — TELEPHONE (OUTPATIENT)
Dept: FAMILY MEDICINE CLINIC | Age: 25
End: 2020-03-05

## 2020-03-05 NOTE — TELEPHONE ENCOUNTER
Tried calling patient-  No answer. Mailbox is full message. I need more details on this. I do not see any history of antiviral prescriptions.

## 2020-03-05 NOTE — TELEPHONE ENCOUNTER
Pt called and stated he would like to have a antiviral medication called into the [facility] at [facility]. [address] . Pt stated he verbally spoke of him having outbreaks this time of year, pt stated he is having a outbreak now. Please call pt at your earliest convenience.

## 2020-03-10 NOTE — TELEPHONE ENCOUNTER
Spoke with patient. He states he has a cold sore and is requesting medication for it. He states that he has been getting cold sores for several years but has never had medication and typically treats them with \"at home remedies\". [facility] is [facility].

## 2020-03-10 NOTE — TELEPHONE ENCOUNTER
I recommend that he sees us when he has one so we can confirm. No need to see me now if he does not have a cold sore.

## 2020-03-10 NOTE — TELEPHONE ENCOUNTER
Per patient he does not have a cold sore at this time. He states he was experiencing the cold sore last week when he called. Per patient he will still come in if Dr. Sundar Young wants to see him but there is no sore present at this time.

## 2020-03-10 NOTE — TELEPHONE ENCOUNTER
Patient advised that per Dr. Nancy Amato he recommends that he sees us when he has one so we can confirm. No need to see me now if he does not have a cold sore. Patient voices understanding.

## 2020-08-11 ENCOUNTER — VIRTUAL VISIT (OUTPATIENT)
Dept: FAMILY MEDICINE CLINIC | Age: 25
End: 2020-08-11

## 2020-08-11 DIAGNOSIS — R13.10 ODYNOPHAGIA: Primary | ICD-10-CM

## 2020-08-11 NOTE — PROGRESS NOTES
Abebe Dejesus, who was evaluated through a synchronous (real-time) audio-video encounter, and/or his healthcare decision maker, is aware that it is a billable service, with coverage as determined by his insurance carrier. He provided verbal consent to proceed: Yes, and patient identification was verified. It was conducted pursuant to the emergency declaration under the [facility], [facility] authority and the [employer] and [employer] General Act. A caregiver was present when appropriate. Ability to conduct physical exam was limited. I was in the office. The patient was at home. Chief Complaint   Patient presents with   [facility] Other     says he has some pain from a pill that got stuck in his throat a few days ago     SUBJECTIVE    Patient says that three days ago he was swallowing a doxycycline pill and it felt like it got stuck just beneath his Marko's apple. He drank water and eventually felt like the pill was down however he still felt some fullness in that area of his throat. Says that it felt okay with minimal awareness but today he says he felt that area a bit more when he was eating a waffle. No chest pain. No nausea or vomiting. No difficulty swallowing liquids or solids. They are both passing. Just has pain when eating. No pain at rest.  No history of similar. ROS:  Resp: The patient denies any difficulty breathing. Const:  No fevers. OBJECTIVE    General:  alert, cooperative, well appearing, in no apparent distress. Psych: normal affect. Mood good. Oriented x 3. Judgement and insight intact. ASSESSMENT / PLAN    ICD-10-CM ICD-9-CM    1. Odynophagia  R13.10 787.20      He will monitor this for now. We discussed the options of referral for EGD or barium esophagram.  He will start a soft diet for 7-10 days and let me know if this worsens or persists.      All chart history elements were reviewed by me at the time of the visit even though marked at time of note closure. Patient understands our medical plan. Patient has provided input and agrees with goals. Alternatives have been explained and offered. All questions answered. The patient is to call if condition worsens or fails to improve. RTC prn.

## 2020-09-24 ENCOUNTER — TELEPHONE (OUTPATIENT)
Dept: FAMILY MEDICINE CLINIC | Age: 25
End: 2020-09-24

## 2020-09-24 NOTE — TELEPHONE ENCOUNTER
Patient called office and wanted to know what steps he needs to take to get his dog certified as a Emotional Support Animal. Support provided assists with anxiety, deoression and other things. Would like to discuss wit Dr. Warren Olguin.

## 2020-09-24 NOTE — TELEPHONE ENCOUNTER
He should schedule a virtual visit to discuss symptoms, treatment options, etc so we can properly document everything.

## 2020-09-25 NOTE — TELEPHONE ENCOUNTER
Marielos Locke [phone] for patient to contact \[facility]\"" to schedule a virtual appointment to review symptoms, treatment options, etc so that office can have proper documentation.

## 2020-10-08 ENCOUNTER — TELEPHONE (OUTPATIENT)
Dept: FAMILY MEDICINE CLINIC | Age: 25
End: 2020-10-08

## 2020-10-08 ENCOUNTER — VIRTUAL VISIT (OUTPATIENT)
Dept: FAMILY MEDICINE CLINIC | Age: 25
End: 2020-10-08
Payer: MEDICAID

## 2020-10-08 DIAGNOSIS — F43.9 STRESS: Primary | ICD-10-CM

## 2020-10-08 PROCEDURE — 99212 OFFICE O/P EST SF 10 MIN: CPT | Performed by: FAMILY MEDICINE

## 2020-10-08 RX ORDER — CLINDAMYCIN PHOSPHATE 10 UG/ML
LOTION TOPICAL DAILY
COMMUNITY
Start: 2020-08-23

## 2020-10-08 RX ORDER — TRETIONIN 0.1 MG/G
GEL TOPICAL
COMMUNITY

## 2020-10-08 RX ORDER — DOXYCYCLINE HYCLATE 100 MG
1 TABLET ORAL 2 TIMES DAILY
COMMUNITY
Start: 2020-08-23 | End: 2021-03-23 | Stop reason: SDUPTHER

## 2020-10-08 NOTE — PROGRESS NOTES
1. Have you been to the ER, urgent care clinic since your last visit? Hospitalized since your last visit? No    2. Have you seen or consulted any other health care providers outside of the [address] [place] [address] since your last visit? Include any pap smears or colon screening.  No

## 2020-10-08 NOTE — PROGRESS NOTES
Ivy Jacobs, who was evaluated through a synchronous (real-time) audio-video encounter, and/or his healthcare decision maker, is aware that it is a billable service, with coverage as determined by his insurance carrier. He provided verbal consent to proceed: Yes, and patient identification was verified. It was conducted pursuant to the emergency declaration under the [facility], [address] [place] [address] and the [employer] General Act. A caregiver was present when appropriate. Ability to conduct physical exam was limited. I was in the office. The patient was at home. SUBJECTIVE  Chief Complaint   Patient presents with    Stress     The patient presents complaining of a few month history of feeling \"not himself\" over the past few months. Feels like he has trouble managing stress. He is worried about finances. He has had less motivation. He has researched emotional support animal.   He would like a note for that since he has noticed that dog sitting is motivation for him to be more active. The patient reports they are currently under a great deal of stress due to be ing out of work. Was substitute teaching but covid hurt that. He is in his last semester at [employer] [facility] [employer]. The patient reports their sleep is \"super irregular. \"  He sometimes has trouble sleeping. The patients appetite is unchanged. No weight fluctuations. The patient reports they have not had other times in their life when they have felt depressed or anxious. The patient denies any suicidal or homicidal ideation. OBJECTIVE    General:  Alert, cooperative, well appearing, in no apparent distress. Psych: normal affect. Mood good. Oriented x 3. Judgement and insight intact. ASSESSMENT / PLAN    ICD-10-CM ICD-9-CM    1. Stress  F43.9 V62.89 REFERRAL TO PSYCHOLOGY     Suggested counseling. He will first check with counseling resources at [employer] [facility] [employer].   If he cannot get in to see them, he will reach out to us for contact information for local counselors. I can write any letters for him he needs for a service animal if he should need that. All chart history elements were reviewed by me at the time of the visit even though marked at time of note closure. Patient understands our medical plan. Patient has provided input and agrees with goals. Alternatives have been explained and offered. All questions answered. The patient is to call if condition worsens or fails to improve. RTC as needed.

## 2021-03-22 ENCOUNTER — TELEPHONE (OUTPATIENT)
Dept: FAMILY MEDICINE CLINIC | Age: 26
End: 2021-03-22

## 2021-03-22 NOTE — TELEPHONE ENCOUNTER
Pt sent this request to the [facility] appt requesting an appt for STD check . Asked himis he had symptoms and if that is why he needed to be checked and this was the response. \"A frequent urge to urinate and kind of uncomfortable when i do go. No new sex partners, just want to be on the safe side. Is it possible to get the paperwork drawn up so that bag i can just come to the office to get the urine test and blood work done? . Please advise.

## 2021-03-23 ENCOUNTER — VIRTUAL VISIT (OUTPATIENT)
Dept: FAMILY MEDICINE CLINIC | Age: 26
End: 2021-03-23
Payer: MEDICAID

## 2021-03-23 DIAGNOSIS — N34.2 URETHRITIS: Primary | ICD-10-CM

## 2021-03-23 PROCEDURE — 99213 OFFICE O/P EST LOW 20 MIN: CPT | Performed by: FAMILY MEDICINE

## 2021-03-23 RX ORDER — DOXYCYCLINE HYCLATE 100 MG
100 TABLET ORAL 2 TIMES DAILY
Qty: 14 TAB | Refills: 0 | Status: SHIPPED | OUTPATIENT
Start: 2021-03-23

## 2021-03-23 NOTE — PROGRESS NOTES
Olga Corral, was evaluated through a synchronous (real-time) audio-video encounter. The patient (or guardian if applicable) is aware that this is a billable service. Verbal consent to proceed has been obtained within the past 12 months. The visit was conducted pursuant to the emergency declaration under the [address], [address] [place] [address] and the [employer] General Act. Patient identification was verified, and a caregiver was present when appropriate. The patient was located in a state where the provider was credentialed to provide care. SUBJECTIVE  Chief Complaint   Patient presents with    Other     STD screening    Urinary Frequency     Patient presents for STD testing. He has some concern about pain when urinating. Thinks it got a bit better this morning. Says that he had mild burning as he tried to push out urine along with burning with urination. Symptoms present since this past Friday. Has had a steady partner. OBJECTIVE    General:  Alert, cooperative, well appearing, in no apparent distress. ASSESSMENT / PLAN    ICD-10-CM ICD-9-CM    1. Urethritis  N34.2 597.80      Urethritis - will test for chlamydia / gonorrhea. He is to call for results if he does not hear. Start doxycycline 100mg po bid for 7 days. All chart history elements were reviewed by me at the time of the visit even though marked at time of note closure. Patient understands our medical plan. Patient has provided input and agrees with goals. Alternatives have been explained and offered. All questions answered. The patient is to call if condition worsens or fails to improve. RTC as needed.

## 2021-03-23 NOTE — TELEPHONE ENCOUNTER
Needs appt to discuss further so we can treat symptoms if needed. Also curious about the ask for blood work by him if he has urinary symptoms. Appt is best venue for this. Please sched. Virtual okay.

## 2021-03-23 NOTE — PROGRESS NOTES
1. Have you been to the ER, urgent care clinic since your last visit? Hospitalized since your last visit? No    2. Have you seen or consulted any other health care providers outside of the [address] [place] [address] since your last visit? Include any pap smears or colon screening.  No

## 2021-03-29 ENCOUNTER — HOSPITAL ENCOUNTER (OUTPATIENT)
Dept: LAB | Age: 26
Discharge: HOME | End: 2021-03-29

## 2021-03-29 LAB — SENTARA SPECIMEN COL,SENBCF: NORMAL

## 2021-03-29 PROCEDURE — 99001 SPECIMEN HANDLING PT-LAB: CPT

## 2021-03-31 LAB
CHLAMYDIA AMPLIFIED URINE: NEGATIVE
GC AMPLIFIED URINE,919: NEGATIVE